# Patient Record
Sex: FEMALE | Race: ASIAN | NOT HISPANIC OR LATINO | URBAN - METROPOLITAN AREA
[De-identification: names, ages, dates, MRNs, and addresses within clinical notes are randomized per-mention and may not be internally consistent; named-entity substitution may affect disease eponyms.]

---

## 2017-07-05 ENCOUNTER — EMERGENCY (EMERGENCY)
Facility: HOSPITAL | Age: 27
LOS: 1 days | Discharge: HOME | End: 2017-07-05
Admitting: EMERGENCY MEDICINE
Payer: MEDICAID

## 2017-07-05 VITALS
DIASTOLIC BLOOD PRESSURE: 79 MMHG | OXYGEN SATURATION: 100 % | TEMPERATURE: 98 F | SYSTOLIC BLOOD PRESSURE: 132 MMHG | HEART RATE: 90 BPM | RESPIRATION RATE: 20 BRPM

## 2017-07-05 PROBLEM — Z00.00 ENCOUNTER FOR PREVENTIVE HEALTH EXAMINATION: Status: ACTIVE | Noted: 2017-07-05

## 2017-07-05 PROCEDURE — 99284 EMERGENCY DEPT VISIT MOD MDM: CPT

## 2017-07-05 PROCEDURE — 73140 X-RAY EXAM OF FINGER(S): CPT | Mod: 26,RT

## 2017-07-05 RX ORDER — TETANUS TOXOID, REDUCED DIPHTHERIA TOXOID AND ACELLULAR PERTUSSIS VACCINE, ADSORBED 5; 2.5; 8; 8; 2.5 [IU]/.5ML; [IU]/.5ML; UG/.5ML; UG/.5ML; UG/.5ML
0.5 SUSPENSION INTRAMUSCULAR ONCE
Qty: 0 | Refills: 0 | Status: COMPLETED | OUTPATIENT
Start: 2017-07-05 | End: 2017-07-05

## 2017-07-05 RX ORDER — IBUPROFEN 200 MG
600 TABLET ORAL ONCE
Qty: 0 | Refills: 0 | Status: COMPLETED | OUTPATIENT
Start: 2017-07-05 | End: 2017-07-05

## 2017-07-05 RX ADMIN — TETANUS TOXOID, REDUCED DIPHTHERIA TOXOID AND ACELLULAR PERTUSSIS VACCINE, ADSORBED 0.5 MILLILITER(S): 5; 2.5; 8; 8; 2.5 SUSPENSION INTRAMUSCULAR at 12:15

## 2017-07-05 RX ADMIN — Medication 1 TABLET(S): at 13:59

## 2017-07-05 RX ADMIN — Medication 600 MILLIGRAM(S): at 12:15

## 2017-07-05 NOTE — ED PROVIDER NOTE - CARE PLAN
Principal Discharge DX:	Finger laceration  Instructions for follow-up, activity and diet:	Rest, drink plenty of fluids  Advance activity as tolerated  Continue all previously prescribed medications as directed  Take Augmentin two times a day for ten days  Call Dr. Alexandre- he will see you in his office today: [phone]   Follow up with your PMD 2-3 days- bring copies of your results  Return to the ER for worsening  Secondary Diagnosis:	Fingernail avulsion  Secondary Diagnosis:	Finger fracture Principal Discharge DX:	Finger laceration  Instructions for follow-up, activity and diet:	Rest, drink plenty of fluids  Advance activity as tolerated  Continue all previously prescribed medications as directed  Take Augmentin two times a day for ten days  Call Dr. Alexandre- he will see you in his office today: [phone]   Follow up with your PMD 2-3 days- bring copies of your results  Return to the ER for worsening  Secondary Diagnosis:	Fingernail avulsion  Secondary Diagnosis:	Finger fracture

## 2017-07-05 NOTE — ED ADULT TRIAGE NOTE - CHIEF COMPLAINT QUOTE
Pt states slammed a door over her right hand pinky finger arrives with bandage over site and dry blood to site.

## 2017-07-05 NOTE — ED PROVIDER NOTE - PHYSICAL EXAMINATION
R 5th finger laceration ~ 2 cm at distal dorsum with avulsion of nail and cuticle bed. Pt unable to full range finger 2/2 pain. Will assess tendon after digital block.

## 2017-07-05 NOTE — ED PROVIDER NOTE - MEDICAL DECISION MAKING DETAILS
R fifth finger laceration and nail avulsion >24 hours ago, PMHx of DM- will give adacel, xray r/o open fx and c/s hand.

## 2017-07-05 NOTE — ED PROVIDER NOTE - PLAN OF CARE
Rest, drink plenty of fluids  Advance activity as tolerated  Continue all previously prescribed medications as directed  Take Augmentin two times a day for ten days  Call Dr. Alexandre- he will see you in his office today: [phone]   Follow up with your PMD 2-3 days- bring copies of your results  Return to the ER for worsening
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
## 2018-07-03 ENCOUNTER — EMERGENCY (EMERGENCY)
Facility: HOSPITAL | Age: 28
LOS: 1 days | Discharge: LEFT BEFORE DISCHARGE | End: 2018-07-03
Attending: EMERGENCY MEDICINE | Admitting: EMERGENCY MEDICINE
Payer: MEDICAID

## 2018-07-03 VITALS
HEART RATE: 89 BPM | RESPIRATION RATE: 16 BRPM | DIASTOLIC BLOOD PRESSURE: 54 MMHG | OXYGEN SATURATION: 100 % | SYSTOLIC BLOOD PRESSURE: 102 MMHG | TEMPERATURE: 99 F

## 2018-07-03 VITALS
OXYGEN SATURATION: 100 % | RESPIRATION RATE: 16 BRPM | SYSTOLIC BLOOD PRESSURE: 100 MMHG | DIASTOLIC BLOOD PRESSURE: 63 MMHG | TEMPERATURE: 98 F | HEART RATE: 92 BPM

## 2018-07-03 LAB
ALBUMIN SERPL ELPH-MCNC: 3.7 G/DL — SIGNIFICANT CHANGE UP (ref 3.3–5)
ALP SERPL-CCNC: 106 U/L — SIGNIFICANT CHANGE UP (ref 40–120)
ALT FLD-CCNC: 23 U/L — SIGNIFICANT CHANGE UP (ref 4–33)
APPEARANCE UR: CLEAR — SIGNIFICANT CHANGE UP
AST SERPL-CCNC: 48 U/L — HIGH (ref 4–32)
BACTERIA # UR AUTO: SIGNIFICANT CHANGE UP
BASE EXCESS BLDV CALC-SCNC: -2.1 MMOL/L — SIGNIFICANT CHANGE UP
BASE EXCESS BLDV CALC-SCNC: -3.8 MMOL/L — SIGNIFICANT CHANGE UP
BASOPHILS # BLD AUTO: 0.03 K/UL — SIGNIFICANT CHANGE UP (ref 0–0.2)
BASOPHILS NFR BLD AUTO: 0.3 % — SIGNIFICANT CHANGE UP (ref 0–2)
BASOPHILS NFR SPEC: 0 % — SIGNIFICANT CHANGE UP (ref 0–2)
BILIRUB SERPL-MCNC: 0.5 MG/DL — SIGNIFICANT CHANGE UP (ref 0.2–1.2)
BILIRUB UR-MCNC: NEGATIVE — SIGNIFICANT CHANGE UP
BLASTS # FLD: 0 % — SIGNIFICANT CHANGE UP (ref 0–0)
BLOOD GAS VENOUS - CREATININE: 0.38 MG/DL — LOW (ref 0.5–1.3)
BLOOD GAS VENOUS - CREATININE: SIGNIFICANT CHANGE UP MG/DL (ref 0.5–1.3)
BLOOD UR QL VISUAL: HIGH
BUN SERPL-MCNC: 11 MG/DL — SIGNIFICANT CHANGE UP (ref 7–23)
BUN SERPL-MCNC: 12 MG/DL — SIGNIFICANT CHANGE UP (ref 7–23)
CALCIUM SERPL-MCNC: 8.1 MG/DL — LOW (ref 8.4–10.5)
CALCIUM SERPL-MCNC: 8.8 MG/DL — SIGNIFICANT CHANGE UP (ref 8.4–10.5)
CHLORIDE BLDV-SCNC: 103 MMOL/L — SIGNIFICANT CHANGE UP (ref 96–108)
CHLORIDE BLDV-SCNC: 104 MMOL/L — SIGNIFICANT CHANGE UP (ref 96–108)
CHLORIDE SERPL-SCNC: 94 MMOL/L — LOW (ref 98–107)
CHLORIDE SERPL-SCNC: 97 MMOL/L — LOW (ref 98–107)
CO2 SERPL-SCNC: 19 MMOL/L — LOW (ref 22–31)
CO2 SERPL-SCNC: 21 MMOL/L — LOW (ref 22–31)
COLOR SPEC: YELLOW — SIGNIFICANT CHANGE UP
CREAT SERPL-MCNC: 0.39 MG/DL — LOW (ref 0.5–1.3)
CREAT SERPL-MCNC: 0.47 MG/DL — LOW (ref 0.5–1.3)
EOSINOPHIL # BLD AUTO: 0.01 K/UL — SIGNIFICANT CHANGE UP (ref 0–0.5)
EOSINOPHIL NFR BLD AUTO: 0.1 % — SIGNIFICANT CHANGE UP (ref 0–6)
EOSINOPHIL NFR FLD: 0 % — SIGNIFICANT CHANGE UP (ref 0–6)
GAS PNL BLDV: 129 MMOL/L — LOW (ref 136–146)
GAS PNL BLDV: 132 MMOL/L — LOW (ref 136–146)
GLUCOSE BLDV-MCNC: 311 — HIGH (ref 70–99)
GLUCOSE BLDV-MCNC: 338 — HIGH (ref 70–99)
GLUCOSE SERPL-MCNC: 311 MG/DL — HIGH (ref 70–99)
GLUCOSE SERPL-MCNC: 326 MG/DL — HIGH (ref 70–99)
GLUCOSE UR-MCNC: >1000 — SIGNIFICANT CHANGE UP
HCO3 BLDV-SCNC: 22 MMOL/L — SIGNIFICANT CHANGE UP (ref 20–27)
HCO3 BLDV-SCNC: 23 MMOL/L — SIGNIFICANT CHANGE UP (ref 20–27)
HCT VFR BLD CALC: 46.7 % — HIGH (ref 34.5–45)
HCT VFR BLDV CALC: 44.6 % — SIGNIFICANT CHANGE UP (ref 34.5–45)
HCT VFR BLDV CALC: 44.7 % — SIGNIFICANT CHANGE UP (ref 34.5–45)
HGB BLD-MCNC: 14.8 G/DL — SIGNIFICANT CHANGE UP (ref 11.5–15.5)
HGB BLDV-MCNC: 14.5 G/DL — SIGNIFICANT CHANGE UP (ref 11.5–15.5)
HGB BLDV-MCNC: 14.6 G/DL — SIGNIFICANT CHANGE UP (ref 11.5–15.5)
IMM GRANULOCYTES # BLD AUTO: 0.05 # — SIGNIFICANT CHANGE UP
IMM GRANULOCYTES NFR BLD AUTO: 0.4 % — SIGNIFICANT CHANGE UP (ref 0–1.5)
KETONES UR-MCNC: SIGNIFICANT CHANGE UP
LACTATE BLDV-MCNC: 2.1 MMOL/L — HIGH (ref 0.5–2)
LACTATE BLDV-MCNC: 3.2 MMOL/L — HIGH (ref 0.5–2)
LEUKOCYTE ESTERASE UR-ACNC: NEGATIVE — SIGNIFICANT CHANGE UP
LIDOCAIN IGE QN: 37.6 U/L — SIGNIFICANT CHANGE UP (ref 7–60)
LYMPHOCYTES # BLD AUTO: 1.02 K/UL — SIGNIFICANT CHANGE UP (ref 1–3.3)
LYMPHOCYTES # BLD AUTO: 9.2 % — LOW (ref 13–44)
LYMPHOCYTES NFR SPEC AUTO: 6 % — LOW (ref 13–44)
MCHC RBC-ENTMCNC: 24.3 PG — LOW (ref 27–34)
MCHC RBC-ENTMCNC: 31.7 % — LOW (ref 32–36)
MCV RBC AUTO: 76.6 FL — LOW (ref 80–100)
METAMYELOCYTES # FLD: 0 % — SIGNIFICANT CHANGE UP (ref 0–1)
MICROCYTES BLD QL: SLIGHT — SIGNIFICANT CHANGE UP
MONOCYTES # BLD AUTO: 0.69 K/UL — SIGNIFICANT CHANGE UP (ref 0–0.9)
MONOCYTES NFR BLD AUTO: 6.2 % — SIGNIFICANT CHANGE UP (ref 2–14)
MONOCYTES NFR BLD: 5.2 % — SIGNIFICANT CHANGE UP (ref 2–9)
MUCOUS THREADS # UR AUTO: SIGNIFICANT CHANGE UP
MYELOCYTES NFR BLD: 0 % — SIGNIFICANT CHANGE UP (ref 0–0)
NEUTROPHIL AB SER-ACNC: 80.2 % — HIGH (ref 43–77)
NEUTROPHILS # BLD AUTO: 9.33 K/UL — HIGH (ref 1.8–7.4)
NEUTROPHILS NFR BLD AUTO: 83.8 % — HIGH (ref 43–77)
NEUTS BAND # BLD: 6 % — SIGNIFICANT CHANGE UP (ref 0–6)
NITRITE UR-MCNC: NEGATIVE — SIGNIFICANT CHANGE UP
NRBC # FLD: 0 — SIGNIFICANT CHANGE UP
OTHER - HEMATOLOGY %: 0 — SIGNIFICANT CHANGE UP
PCO2 BLDV: 32 MMHG — LOW (ref 41–51)
PCO2 BLDV: 36 MMHG — LOW (ref 41–51)
PH BLDV: 7.4 PH — SIGNIFICANT CHANGE UP (ref 7.32–7.43)
PH BLDV: 7.42 PH — SIGNIFICANT CHANGE UP (ref 7.32–7.43)
PH UR: 6 — SIGNIFICANT CHANGE UP (ref 4.6–8)
PLATELET # BLD AUTO: 81 K/UL — LOW (ref 150–400)
PLATELET COUNT - ESTIMATE: SIGNIFICANT CHANGE UP
PMV BLD: 10.2 FL — SIGNIFICANT CHANGE UP (ref 7–13)
PO2 BLDV: 136 MMHG — HIGH (ref 35–40)
PO2 BLDV: 54 MMHG — HIGH (ref 35–40)
POTASSIUM BLDV-SCNC: 4.9 MMOL/L — HIGH (ref 3.4–4.5)
POTASSIUM BLDV-SCNC: 4.9 MMOL/L — HIGH (ref 3.4–4.5)
POTASSIUM SERPL-MCNC: 5.3 MMOL/L — SIGNIFICANT CHANGE UP (ref 3.5–5.3)
POTASSIUM SERPL-MCNC: 5.5 MMOL/L — HIGH (ref 3.5–5.3)
POTASSIUM SERPL-SCNC: 5.3 MMOL/L — SIGNIFICANT CHANGE UP (ref 3.5–5.3)
POTASSIUM SERPL-SCNC: 5.5 MMOL/L — HIGH (ref 3.5–5.3)
PROMYELOCYTES # FLD: 0 % — SIGNIFICANT CHANGE UP (ref 0–0)
PROT SERPL-MCNC: 7.6 G/DL — SIGNIFICANT CHANGE UP (ref 6–8.3)
PROT UR-MCNC: 20 MG/DL — SIGNIFICANT CHANGE UP
RBC # BLD: 6.1 M/UL — HIGH (ref 3.8–5.2)
RBC # FLD: 14.2 % — SIGNIFICANT CHANGE UP (ref 10.3–14.5)
RBC CASTS # UR COMP ASSIST: SIGNIFICANT CHANGE UP (ref 0–?)
SAO2 % BLDV: 86.2 % — HIGH (ref 60–85)
SAO2 % BLDV: 98.3 % — HIGH (ref 60–85)
SODIUM SERPL-SCNC: 131 MMOL/L — LOW (ref 135–145)
SODIUM SERPL-SCNC: 132 MMOL/L — LOW (ref 135–145)
SP GR SPEC: 1.04 — SIGNIFICANT CHANGE UP (ref 1–1.04)
SQUAMOUS # UR AUTO: SIGNIFICANT CHANGE UP
UROBILINOGEN FLD QL: NORMAL MG/DL — SIGNIFICANT CHANGE UP
VARIANT LYMPHS # BLD: 2.6 % — SIGNIFICANT CHANGE UP
WBC # BLD: 11.13 K/UL — HIGH (ref 3.8–10.5)
WBC # FLD AUTO: 11.13 K/UL — HIGH (ref 3.8–10.5)
WBC UR QL: SIGNIFICANT CHANGE UP (ref 0–?)

## 2018-07-03 PROCEDURE — 74177 CT ABD & PELVIS W/CONTRAST: CPT | Mod: 26

## 2018-07-03 PROCEDURE — 99284 EMERGENCY DEPT VISIT MOD MDM: CPT

## 2018-07-03 RX ORDER — SODIUM CHLORIDE 9 MG/ML
1000 INJECTION INTRAMUSCULAR; INTRAVENOUS; SUBCUTANEOUS ONCE
Qty: 0 | Refills: 0 | Status: DISCONTINUED | OUTPATIENT
Start: 2018-07-03 | End: 2018-07-03

## 2018-07-03 RX ORDER — FAMOTIDINE 10 MG/ML
20 INJECTION INTRAVENOUS ONCE
Qty: 0 | Refills: 0 | Status: COMPLETED | OUTPATIENT
Start: 2018-07-03 | End: 2018-07-03

## 2018-07-03 RX ORDER — ACETAMINOPHEN 500 MG
1000 TABLET ORAL ONCE
Qty: 0 | Refills: 0 | Status: COMPLETED | OUTPATIENT
Start: 2018-07-03 | End: 2018-07-03

## 2018-07-03 RX ORDER — SODIUM CHLORIDE 9 MG/ML
1000 INJECTION INTRAMUSCULAR; INTRAVENOUS; SUBCUTANEOUS ONCE
Qty: 0 | Refills: 0 | Status: COMPLETED | OUTPATIENT
Start: 2018-07-03 | End: 2018-07-03

## 2018-07-03 RX ORDER — SODIUM CHLORIDE 9 MG/ML
1000 INJECTION, SOLUTION INTRAVENOUS
Qty: 0 | Refills: 0 | Status: DISCONTINUED | OUTPATIENT
Start: 2018-07-03 | End: 2018-07-07

## 2018-07-03 RX ORDER — LIDOCAINE 4 G/100G
15 CREAM TOPICAL ONCE
Qty: 0 | Refills: 0 | Status: COMPLETED | OUTPATIENT
Start: 2018-07-03 | End: 2018-07-03

## 2018-07-03 RX ADMIN — FAMOTIDINE 20 MILLIGRAM(S): 10 INJECTION INTRAVENOUS at 17:48

## 2018-07-03 RX ADMIN — Medication 30 MILLILITER(S): at 17:48

## 2018-07-03 RX ADMIN — LIDOCAINE 15 MILLILITER(S): 4 CREAM TOPICAL at 17:48

## 2018-07-03 RX ADMIN — Medication 400 MILLIGRAM(S): at 18:03

## 2018-07-03 RX ADMIN — SODIUM CHLORIDE 1000 MILLILITER(S): 9 INJECTION INTRAMUSCULAR; INTRAVENOUS; SUBCUTANEOUS at 17:40

## 2018-07-03 RX ADMIN — Medication 20 MILLIGRAM(S): at 18:06

## 2018-07-03 NOTE — ED PROVIDER NOTE - CARE PLAN
Principal Discharge DX:	Abdominal pain  Assessment and plan of treatment:	You are leaving the hospital against medical advice  Follow up with your primary care provider within 48 hours  Return to the emergency department with any worsening or concerning symptoms, increased abdominal pain, weakness, nausea, vomiting, fever or any other concerns.

## 2018-07-03 NOTE — ED ADULT NURSE NOTE - OBJECTIVE STATEMENT
Receive pt in intake alert and oriented x 3 c/o epigastric pain,  and diarrhea.  Denies dizziness, chills, nausea, recent travel or sick contact.  IV placed.  Labs sent.  VS see charting.  Meds given.  NS in progress

## 2018-07-03 NOTE — ED PROVIDER NOTE - PLAN OF CARE
You are leaving the hospital against medical advice  Follow up with your primary care provider within 48 hours  Return to the emergency department with any worsening or concerning symptoms, increased abdominal pain, weakness, nausea, vomiting, fever or any other concerns.

## 2018-07-03 NOTE — ED PROVIDER NOTE - ATTENDING CONTRIBUTION TO CARE
I performed a face to face bedside interview with patient regarding history of present illness, review of symptoms and past medical history. I completed an independent physical exam.  I have discussed patient's plan of care.   I agree with note as stated above, having amended the EMR as needed to reflect my findings. I have discussed the assessment and plan of care.  This includes during the time I functioned as the attending physician for this patient.  Attending Contribution to Care: agree with plan of resident. pt p/w one day of diarrhea, n/v, abdominal pain that began this morning. labs with mild hyponatremia. pt with no dka. toelrating po. pt ama out of ed secondary to request to f/u with pmd albeit elevated glc. pt understands risks and will f/u. no slurred speech. no unsteady gait.   Pt is alert and oriented. The pt understands the illness, suggessted treatment and risks and benefits. They understand the risk including permenant disability, or death. The pt is acting rationally and does not want our suggested treatment. They were instructed to f/u with their pmd immediately and instructed to return with any concerns.

## 2018-07-03 NOTE — ED SUB INTERN NOTE - OBJECTIVE STATEMENT FT
The patient notes that this episode began this morning w/ multiple episodes of watery diarrhea. She then developed intermittent, sharp, midline, upper abdominal pain that does not radiate. She had 6 episodes of NBNB emesis and associated decrease in PO intake. Last FSG yesterday was 101. Denies fever, chills, chest pain, SOB, hematochezia, melena, hematemesis, urinary symptoms. LMP was 6/4 and pt has irregular periods.

## 2018-07-03 NOTE — ED SUB INTERN NOTE - GASTROINTESTINAL, MLM
Abdomen soft, ND, TTP in epigastric region and suprapubic region, no rebound, no guarding. +BS. No CVA tenderness noted.

## 2018-07-03 NOTE — ED SUB INTERN NOTE - MEDICAL DECISION MAKING DETAILS
26 y/o F w/ PMH of DM p/w abdominal pain, emesis x6, and diarrhea. PE significant for epigastric and suprapubic TTP.  - will get CBC, CMP, lipase, UA, HCG, FSG, VBG  - will give IVF, GI cocktail, and reassess

## 2018-07-03 NOTE — ED PROVIDER NOTE - OBJECTIVE STATEMENT
28 yo female with PMH of DM on metformin, presents to the ED for one day of diarrhea, n/v, abdominal pain that began this morning. Pt felt well throughout the day yesterday, woke up and had multiple episodes of watery diarrhea. She then developed intermittent, sharp, midline, upper abdominal pain that does not radiate. She had 6 episodes of NBNB emesis and associated decrease in PO intake. Last FSG yesterday was 101. Denies fever, chills, chest pain, SOB, hematochezia, melena, hematemesis, urinary symptoms. LMP was 6/4 and pt has irregular periods.

## 2018-07-03 NOTE — ED ADULT TRIAGE NOTE - CHIEF COMPLAINT QUOTE
pt amb to triage c/o N/V/D associated w/ epigastric pain since this morning, last able to tolerate PO last night, LMP 6/4, denies sick contacts

## 2018-07-03 NOTE — ED PROVIDER NOTE - PROGRESS NOTE DETAILS
Pt having intermittent epigastric pain. Will order IV tylenol and bentyl, reeval; if lipase elevated, will obtain lipid panel and RUQ US NEIL More: Pt signed out to me by : 27yF w/pmhx DM presented with n/v/d, abdominal pain, found to have elevated glucose, CT w/ subaceous cyst no internal abdominal pathology as cause of pain. Pt evaluated with , she is now feeling much better. Plan is to repeat labs and likely d/c home with PMD follow up. NEIL More: Discussed with pt that her glucose is still elevated at 311, lactate elevated at 2.1, anion gap is now 15 from 17. Recommended another liter of fluids with repeat labs. Pt is requesting to leave the hospital and follow up with her primary care provider states she feels well and does not want more fluids. Discussed the risks of leaving witht he pt and she continues to request to leave. Franco pt sign out AMA. The pt is clinically sober, AA&Ox3, free from distracting injury.  Throughout our interactions in the ED today, the pt has demonstrated concrete thinking/reasoning, has maintained an orderly/reasonable conversation, appears to have intact insight/judgment/reason and therefore in our opinion has capacity to make decisions.  Given the pt’s presentation, we communicated our concern for hyperglycemia and electrolyte abnormalities.    The pt verbalized an understanding of our worries. We’ve told the patient that the ED evaluation is incomplete & many troublesome conditions haven’t been r/o.   We have discussed the need for further ED w/u so we can get more information about her blood sugar control.  We have discussed the range of possible dx, potential testing & tx options.  We’ve made  numerous efforts to prevent the pt from leaving AMA.  Our discussions included the potential outcomes of leaving AMA, including worsening of their condition, becoming permanently disabled/in pain/critically ill, or death.  Despite these efforts, we were unable to convince the pt to stay.  The pt is refusing any  further care and is leaving against medical advice. We have attempted to offer tx/rx/guidance for any dangerous conditions which are most likely and/or dangerous.  We have answered all questions and have implored the pt to return ASAP to complete the w/u.  A staff member witnessed the patient consenting to AMA.

## 2018-07-04 LAB — SPECIMEN SOURCE: SIGNIFICANT CHANGE UP

## 2018-07-05 LAB — BACTERIA UR CULT: SIGNIFICANT CHANGE UP

## 2018-07-05 NOTE — ED POST DISCHARGE NOTE - RESULT SUMMARY
UCX: Culture grew 3 or more types of organisms which indicate collection contamination; consider recollection only if clinically indicated.

## 2018-07-05 NOTE — ED POST DISCHARGE NOTE - DETAILS
[phone] - Discussed UCX results with patient.  Pt denies fever, chills, dysuria, hematuria, frequency or abdominal pain.  Discussed with pt to follow up with PMD for repeat UA/UCX.

## 2019-06-18 NOTE — ED ADULT TRIAGE NOTE - BP NONINVASIVE DIASTOLIC (MM HG)
Referral made for diminished hearing at patient's request.
Romaine Srinivasan wants an order for an hearing eval for diminished hearing faxed to [facility] for hearing [facility] [phone]
[phone]

## 2020-08-19 ENCOUNTER — EMERGENCY (EMERGENCY)
Age: 30
LOS: 1 days | Discharge: HOME | End: 2020-08-19
Admitting: STUDENT IN AN ORGANIZED HEALTH CARE EDUCATION/TRAINING PROGRAM
Payer: MEDICAID

## 2020-08-19 VITALS
DIASTOLIC BLOOD PRESSURE: 82 MMHG | TEMPERATURE: 97 F | RESPIRATION RATE: 18 BRPM | OXYGEN SATURATION: 97 % | SYSTOLIC BLOOD PRESSURE: 116 MMHG | HEART RATE: 93 BPM

## 2020-08-19 VITALS
OXYGEN SATURATION: 98 % | DIASTOLIC BLOOD PRESSURE: 75 MMHG | SYSTOLIC BLOOD PRESSURE: 110 MMHG | TEMPERATURE: 99 F | HEART RATE: 108 BPM | RESPIRATION RATE: 98 BRPM

## 2020-08-19 PROCEDURE — 99283 EMERGENCY DEPT VISIT LOW MDM: CPT
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
## 2020-08-20 PROBLEM — E11.9 TYPE 2 DIABETES MELLITUS WITHOUT COMPLICATIONS: Chronic | Status: ACTIVE | Noted: 2017-07-05

## 2020-08-20 PROCEDURE — 72100 X-RAY EXAM L-S SPINE 2/3 VWS: CPT | Mod: 26

## 2020-08-20 RX ORDER — IBUPROFEN 200 MG
600 TABLET ORAL ONCE
Refills: 0 | Status: COMPLETED | OUTPATIENT
Start: 2020-08-20 | End: 2020-08-20

## 2020-08-20 RX ADMIN — Medication 600 MILLIGRAM(S): at 00:41

## 2020-08-20 NOTE — ED PROVIDER NOTE - PATIENT PORTAL LINK FT
You can access the FollowMyHealth Patient Portal offered by [facility] by registering at the following website: [email or web address]. By joining [employer]’s FollowMyHealth portal, you will also be able to view your health information using other applications (apps) compatible with our system.
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
## 2020-10-31 ENCOUNTER — EMERGENCY (EMERGENCY)
Facility: HOSPITAL | Age: 30
LOS: 1 days | Discharge: HOME | End: 2020-10-31
Attending: EMERGENCY MEDICINE | Admitting: EMERGENCY MEDICINE
Payer: MEDICAID

## 2020-10-31 VITALS
TEMPERATURE: 99 F | HEART RATE: 102 BPM | RESPIRATION RATE: 18 BRPM | OXYGEN SATURATION: 100 % | DIASTOLIC BLOOD PRESSURE: 85 MMHG | SYSTOLIC BLOOD PRESSURE: 114 MMHG

## 2020-10-31 VITALS
TEMPERATURE: 98 F | SYSTOLIC BLOOD PRESSURE: 129 MMHG | HEART RATE: 104 BPM | DIASTOLIC BLOOD PRESSURE: 92 MMHG | RESPIRATION RATE: 18 BRPM | OXYGEN SATURATION: 100 %

## 2020-10-31 PROBLEM — Z78.9 OTHER SPECIFIED HEALTH STATUS: Chronic | Status: ACTIVE | Noted: 2020-08-20

## 2020-10-31 LAB
ALBUMIN SERPL ELPH-MCNC: 3.4 G/DL — SIGNIFICANT CHANGE UP (ref 3.3–5)
ALP SERPL-CCNC: 103 U/L — SIGNIFICANT CHANGE UP (ref 40–120)
ALT FLD-CCNC: 22 U/L — SIGNIFICANT CHANGE UP (ref 4–33)
ANION GAP SERPL CALC-SCNC: 12 MMO/L — SIGNIFICANT CHANGE UP (ref 7–14)
AST SERPL-CCNC: 23 U/L — SIGNIFICANT CHANGE UP (ref 4–32)
BASOPHILS # BLD AUTO: 0.01 K/UL — SIGNIFICANT CHANGE UP (ref 0–0.2)
BASOPHILS NFR BLD AUTO: 0.2 % — SIGNIFICANT CHANGE UP (ref 0–2)
BILIRUB SERPL-MCNC: 0.3 MG/DL — SIGNIFICANT CHANGE UP (ref 0.2–1.2)
BUN SERPL-MCNC: 11 MG/DL — SIGNIFICANT CHANGE UP (ref 7–23)
CALCIUM SERPL-MCNC: 8.7 MG/DL — SIGNIFICANT CHANGE UP (ref 8.4–10.5)
CHLORIDE SERPL-SCNC: 102 MMOL/L — SIGNIFICANT CHANGE UP (ref 98–107)
CO2 SERPL-SCNC: 20 MMOL/L — LOW (ref 22–31)
CREAT SERPL-MCNC: 0.52 MG/DL — SIGNIFICANT CHANGE UP (ref 0.5–1.3)
EOSINOPHIL # BLD AUTO: 0.01 K/UL — SIGNIFICANT CHANGE UP (ref 0–0.5)
EOSINOPHIL NFR BLD AUTO: 0.2 % — SIGNIFICANT CHANGE UP (ref 0–6)
GLUCOSE SERPL-MCNC: 339 MG/DL — HIGH (ref 70–99)
HCT VFR BLD CALC: 41.2 % — SIGNIFICANT CHANGE UP (ref 34.5–45)
HGB BLD-MCNC: 13.6 G/DL — SIGNIFICANT CHANGE UP (ref 11.5–15.5)
IMM GRANULOCYTES NFR BLD AUTO: 0.6 % — SIGNIFICANT CHANGE UP (ref 0–1.5)
LIDOCAIN IGE QN: 38.7 U/L — SIGNIFICANT CHANGE UP (ref 7–60)
LYMPHOCYTES # BLD AUTO: 1.71 K/UL — SIGNIFICANT CHANGE UP (ref 1–3.3)
LYMPHOCYTES # BLD AUTO: 32.7 % — SIGNIFICANT CHANGE UP (ref 13–44)
MAGNESIUM SERPL-MCNC: 1.7 MG/DL — SIGNIFICANT CHANGE UP (ref 1.6–2.6)
MCHC RBC-ENTMCNC: 26.7 PG — LOW (ref 27–34)
MCHC RBC-ENTMCNC: 33 % — SIGNIFICANT CHANGE UP (ref 32–36)
MCV RBC AUTO: 80.9 FL — SIGNIFICANT CHANGE UP (ref 80–100)
MONOCYTES # BLD AUTO: 0.3 K/UL — SIGNIFICANT CHANGE UP (ref 0–0.9)
MONOCYTES NFR BLD AUTO: 5.7 % — SIGNIFICANT CHANGE UP (ref 2–14)
NEUTROPHILS # BLD AUTO: 3.17 K/UL — SIGNIFICANT CHANGE UP (ref 1.8–7.4)
NEUTROPHILS NFR BLD AUTO: 60.6 % — SIGNIFICANT CHANGE UP (ref 43–77)
NRBC # FLD: 0 K/UL — SIGNIFICANT CHANGE UP (ref 0–0)
PHOSPHATE SERPL-MCNC: 3.3 MG/DL — SIGNIFICANT CHANGE UP (ref 2.5–4.5)
PLATELET # BLD AUTO: 100 K/UL — LOW (ref 150–400)
PMV BLD: 9.5 FL — SIGNIFICANT CHANGE UP (ref 7–13)
POTASSIUM SERPL-MCNC: 4.1 MMOL/L — SIGNIFICANT CHANGE UP (ref 3.5–5.3)
POTASSIUM SERPL-SCNC: 4.1 MMOL/L — SIGNIFICANT CHANGE UP (ref 3.5–5.3)
PROT SERPL-MCNC: 7.1 G/DL — SIGNIFICANT CHANGE UP (ref 6–8.3)
RBC # BLD: 5.09 M/UL — SIGNIFICANT CHANGE UP (ref 3.8–5.2)
RBC # FLD: 12 % — SIGNIFICANT CHANGE UP (ref 10.3–14.5)
SARS-COV-2 RNA SPEC QL NAA+PROBE: DETECTED
SODIUM SERPL-SCNC: 134 MMOL/L — LOW (ref 135–145)
WBC # BLD: 5.23 K/UL — SIGNIFICANT CHANGE UP (ref 3.8–10.5)
WBC # FLD AUTO: 5.23 K/UL — SIGNIFICANT CHANGE UP (ref 3.8–10.5)

## 2020-10-31 PROCEDURE — 99284 EMERGENCY DEPT VISIT MOD MDM: CPT

## 2020-10-31 PROCEDURE — 76705 ECHO EXAM OF ABDOMEN: CPT | Mod: 26

## 2020-10-31 RX ORDER — IBUPROFEN 200 MG
600 TABLET ORAL ONCE
Refills: 0 | Status: COMPLETED | OUTPATIENT
Start: 2020-10-31 | End: 2020-10-31

## 2020-10-31 RX ORDER — IBUPROFEN 200 MG
600 TABLET ORAL ONCE
Refills: 0 | Status: DISCONTINUED | OUTPATIENT
Start: 2020-10-31 | End: 2020-10-31

## 2020-10-31 RX ORDER — IBUPROFEN 200 MG
1 TABLET ORAL
Qty: 28 | Refills: 0
Start: 2020-10-31 | End: 2020-11-06

## 2020-10-31 RX ADMIN — Medication 600 MILLIGRAM(S): at 17:27

## 2020-10-31 NOTE — ED PROVIDER NOTE - CLINICAL SUMMARY MEDICAL DECISION MAKING FREE TEXT BOX
29F here for 1 week history of myalgias, malaise and epigastric pain. Concern for covid vs flu vs viral illness and cholelithiasis/cystitis. Low concern for ACS vs PNA. Will order cbc, cmp, covid, RUQ scan and reassess. Ibuprofen for pain. Traci att: 29 F here for 1 week history of myalgias, malaise and epigastric pain. Concern for covid vs flu vs viral illness and cholelithiasis/cystitis. Low concern for ACS vs PNA. Will order cbc, cmp, covid, RUQ scan and reassess. Ibuprofen for pain.

## 2020-10-31 NOTE — ED PROVIDER NOTE - PATIENT PORTAL LINK FT
You can access the FollowMyHealth Patient Portal offered by [facility] by registering at the following website: [email or web address]. By joining [employer]’s FollowMyHealth portal, you will also be able to view your health information using other applications (apps) compatible with our system.

## 2020-10-31 NOTE — ED PROVIDER NOTE - NSFOLLOWUPINSTRUCTIONS_ED_ALL_ED_FT
You have a viral syndrome. This can last from 5-10 days.     Alternate Tylenol and Motrin every 4-6 hours for fever control as well as body aches and chills. Drink plenty of fluids. Rest. Return to the emergency room for worsening condition or new concerning symptoms. Follow up with your regular doctor. If you don't have a regular doctor use one of the numbers below to establish a primary care doctor.    A viral infection can be caused by different types of viruses. Most viral infections are not serious and resolve on their own. However, some infections may cause severe symptoms and may lead to further complications.    SYMPTOMS  Viruses can frequently cause:  Minor sore throat. Aches and pains. Headaches. Runny nose. Different types of rashes. Watery eyes. Tiredness. Cough. Loss of appetite. Gastrointestinal infections, resulting in nausea, vomiting, and diarrhea.     These symptoms do not respond to antibiotics because the infection is not caused by bacteria. However, you might catch a bacterial infection following the viral infection. This is sometimes called a "superinfection."   Symptoms of such a bacterial infection may include:  Worsening sore throat with pus and difficulty swallowing. Swollen neck glands. Chills and a high or persistent fever. Severe headache. Tenderness over the sinuses. Persistent overall ill feeling (malaise), muscle aches, and tiredness (fatigue). Persistent cough. Yellow, green, or brown mucus production with coughing.     HOME CARE INSTRUCTIONS   Only take over-the-counter or prescription medicines for pain, discomfort, diarrhea, or fever as directed by your caregiver.   Drink enough water and fluids to keep your urine clear or pale yellow. Sports drinks can provide valuable electrolytes, sugars, and hydration. Get plenty of rest and maintain proper nutrition.   Soups and broths with crackers or rice are fine.     SEEK IMMEDIATE MEDICAL CARE IF:   You have severe headaches, shortness of breath, chest pain, neck pain, or an unusual rash. You have uncontrolled vomiting, diarrhea, or you are unable to keep down fluids.

## 2020-10-31 NOTE — ED ADULT NURSE NOTE - OBJECTIVE STATEMENT
received pt in bed A and OX 3 in NAD resting comfortably, c/o generalized body aches, and malaise, denies fever, chills,  diarrhea, constipation,  pt endorses nausea, denies vomiting. abd soft non distended BS present 4 quadrants, orders noted and completed.

## 2020-10-31 NOTE — ED ADULT NURSE REASSESSMENT NOTE - NS ED NURSE REASSESS COMMENT FT1
Pt resting comfortably and in no acute distress at this time. Pt currently awaiting COVID results. Will continue to monitor.

## 2020-10-31 NOTE — ED PROVIDER NOTE - PHYSICAL EXAMINATION
Subjective:       Patient ID: Ralph Hughes is a 31 y.o. female.    Chief Complaint:  No chief complaint on file.      History of Present Illness  HPI  {OBG HPI BLOCKS:20776}    GYN & OB History  No LMP recorded.   Date of Last Pap: 2014    OB History    Para Term  AB SAB TAB Ectopic Multiple Living   3 2 1 1      2      # Outcome Date GA Lbr Vipin/2nd Weight Sex Delivery Anes PTL Lv   3             2  06/15/11 34w0d  2.155 kg (4 lb 12 oz) F CS-LTranv EPI Y Y   1 Term 09 40w0d  3.459 kg (7 lb 10 oz) F CS-LTranv EPI N Y      Complications: Fetal Intolerance          Review of Systems  Review of Systems        Objective:    Physical Exam       Assessment:      No diagnosis found.         Plan:      There are no diagnoses linked to this encounter.  No Follow-up on file.         Gen: WDWN, NAD  HEENT: EOMI, no nasal discharge, mucous membranes moist  CV: RRR, +S1/S2, no M/R/G  Resp: CTAB, no W/R/R  GI: Abdomen soft non-distended, TTP in the epigastrium, no masses  MSK: No open wounds, no bruising, no LE edema  Neuro: A&Ox4, following commands, moving all four extremities spontaneously  Psych: appropriate mood, denies AH, VH, SI

## 2020-10-31 NOTE — ED PROVIDER NOTE - OBJECTIVE STATEMENT
29F   feeling sick since Sunday, complaining of headache, body aches, nausea with eating, abdominal pain, no fevers, no sick contacts, no recent travel, no recent covid test, no cough, no senseof smell and taste all week   Non-responsive to nyquil and aleve (last time last night)

## 2021-08-03 NOTE — ED PROVIDER NOTE - SKIN LOCATION #1
Initiate Treatment: Triamcinalone cream twice daily for two weeks, then discontinue
Detail Level: Zone
Render In Strict Bullet Format?: No
finger

## 2021-12-13 NOTE — ED PROVIDER NOTE - ATTENDING CONTRIBUTION TO CARE
35 mL of dark red fluid noted in bakri chamber. Dr bazan notified, no new orders rec'd.    I was physically present for the E/M service provided. I agree with above history, physical, and plan which I have reviewed and edited where appropriate. I was physically present for the key portions of the service provided.

## 2024-09-14 ENCOUNTER — EMERGENCY (EMERGENCY)
Facility: HOSPITAL | Age: 34
LOS: 1 days | Discharge: HOME | End: 2024-09-14
Attending: EMERGENCY MEDICINE | Admitting: EMERGENCY MEDICINE
Payer: MEDICAID

## 2024-09-14 VITALS
RESPIRATION RATE: 19 BRPM | OXYGEN SATURATION: 99 % | HEART RATE: 80 BPM | TEMPERATURE: 99 F | DIASTOLIC BLOOD PRESSURE: 60 MMHG | SYSTOLIC BLOOD PRESSURE: 124 MMHG

## 2024-09-14 VITALS — WEIGHT: 154.1 LBS | HEIGHT: 65 IN

## 2024-09-14 LAB
ALBUMIN SERPL ELPH-MCNC: 3 G/DL — LOW (ref 3.3–5)
ALP SERPL-CCNC: 103 U/L — SIGNIFICANT CHANGE UP (ref 40–120)
ALT FLD-CCNC: 15 U/L — SIGNIFICANT CHANGE UP (ref 4–33)
ANION GAP SERPL CALC-SCNC: 10 MMOL/L — SIGNIFICANT CHANGE UP (ref 7–14)
APPEARANCE UR: ABNORMAL
AST SERPL-CCNC: 13 U/L — SIGNIFICANT CHANGE UP (ref 4–32)
BACTERIA # UR AUTO: ABNORMAL /HPF
BASOPHILS # BLD AUTO: 0.03 K/UL — SIGNIFICANT CHANGE UP (ref 0–0.2)
BASOPHILS NFR BLD AUTO: 0.5 % — SIGNIFICANT CHANGE UP (ref 0–2)
BILIRUB SERPL-MCNC: 0.3 MG/DL — SIGNIFICANT CHANGE UP (ref 0.2–1.2)
BILIRUB UR-MCNC: NEGATIVE — SIGNIFICANT CHANGE UP
BUN SERPL-MCNC: 10 MG/DL — SIGNIFICANT CHANGE UP (ref 7–23)
CALCIUM SERPL-MCNC: 8.3 MG/DL — LOW (ref 8.4–10.5)
CHLORIDE SERPL-SCNC: 105 MMOL/L — SIGNIFICANT CHANGE UP (ref 98–107)
CO2 SERPL-SCNC: 22 MMOL/L — SIGNIFICANT CHANGE UP (ref 22–31)
COLOR SPEC: YELLOW — SIGNIFICANT CHANGE UP
CREAT SERPL-MCNC: 0.58 MG/DL — SIGNIFICANT CHANGE UP (ref 0.5–1.3)
DIFF PNL FLD: ABNORMAL
EGFR: 124 ML/MIN/1.73M2 — SIGNIFICANT CHANGE UP
EOSINOPHIL # BLD AUTO: 0.08 K/UL — SIGNIFICANT CHANGE UP (ref 0–0.5)
EOSINOPHIL NFR BLD AUTO: 1.3 % — SIGNIFICANT CHANGE UP (ref 0–6)
EPI CELLS # UR: PRESENT
GLUCOSE SERPL-MCNC: 272 MG/DL — HIGH (ref 70–99)
GLUCOSE UR QL: >=1000 MG/DL
HCG SERPL-ACNC: 1922 MIU/ML — SIGNIFICANT CHANGE UP
HCT VFR BLD CALC: 39.3 % — SIGNIFICANT CHANGE UP (ref 34.5–45)
HGB BLD-MCNC: 13.3 G/DL — SIGNIFICANT CHANGE UP (ref 11.5–15.5)
HYALINE CASTS # UR AUTO: PRESENT
IANC: 3.49 K/UL — SIGNIFICANT CHANGE UP (ref 1.8–7.4)
IMM GRANULOCYTES NFR BLD AUTO: 0.5 % — SIGNIFICANT CHANGE UP (ref 0–0.9)
KETONES UR-MCNC: NEGATIVE MG/DL — SIGNIFICANT CHANGE UP
LEUKOCYTE ESTERASE UR-ACNC: NEGATIVE — SIGNIFICANT CHANGE UP
LIDOCAIN IGE QN: 53 U/L — SIGNIFICANT CHANGE UP (ref 7–60)
LYMPHOCYTES # BLD AUTO: 2.29 K/UL — SIGNIFICANT CHANGE UP (ref 1–3.3)
LYMPHOCYTES # BLD AUTO: 36.2 % — SIGNIFICANT CHANGE UP (ref 13–44)
MCHC RBC-ENTMCNC: 28.7 PG — SIGNIFICANT CHANGE UP (ref 27–34)
MCHC RBC-ENTMCNC: 33.8 GM/DL — SIGNIFICANT CHANGE UP (ref 32–36)
MCV RBC AUTO: 84.9 FL — SIGNIFICANT CHANGE UP (ref 80–100)
MONOCYTES # BLD AUTO: 0.4 K/UL — SIGNIFICANT CHANGE UP (ref 0–0.9)
MONOCYTES NFR BLD AUTO: 6.3 % — SIGNIFICANT CHANGE UP (ref 2–14)
NEUTROPHILS # BLD AUTO: 3.49 K/UL — SIGNIFICANT CHANGE UP (ref 1.8–7.4)
NEUTROPHILS NFR BLD AUTO: 55.2 % — SIGNIFICANT CHANGE UP (ref 43–77)
NITRITE UR-MCNC: NEGATIVE — SIGNIFICANT CHANGE UP
NRBC # BLD: 0 /100 WBCS — SIGNIFICANT CHANGE UP (ref 0–0)
NRBC # FLD: 0 K/UL — SIGNIFICANT CHANGE UP (ref 0–0)
PH UR: 6.5 — SIGNIFICANT CHANGE UP (ref 5–8)
PLATELET # BLD AUTO: 186 K/UL — SIGNIFICANT CHANGE UP (ref 150–400)
POTASSIUM SERPL-MCNC: 4.1 MMOL/L — SIGNIFICANT CHANGE UP (ref 3.5–5.3)
POTASSIUM SERPL-SCNC: 4.1 MMOL/L — SIGNIFICANT CHANGE UP (ref 3.5–5.3)
PROT SERPL-MCNC: 6.2 G/DL — SIGNIFICANT CHANGE UP (ref 6–8.3)
PROT UR-MCNC: >=1000 MG/DL
RBC # BLD: 4.63 M/UL — SIGNIFICANT CHANGE UP (ref 3.8–5.2)
RBC # FLD: 12.3 % — SIGNIFICANT CHANGE UP (ref 10.3–14.5)
RBC CASTS # UR COMP ASSIST: 6 /HPF — HIGH (ref 0–4)
SODIUM SERPL-SCNC: 137 MMOL/L — SIGNIFICANT CHANGE UP (ref 135–145)
SP GR SPEC: 1.04 — HIGH (ref 1–1.03)
UROBILINOGEN FLD QL: 1 MG/DL — SIGNIFICANT CHANGE UP (ref 0.2–1)
WBC # BLD: 6.32 K/UL — SIGNIFICANT CHANGE UP (ref 3.8–10.5)
WBC # FLD AUTO: 6.32 K/UL — SIGNIFICANT CHANGE UP (ref 3.8–10.5)
WBC UR QL: 2 /HPF — SIGNIFICANT CHANGE UP (ref 0–5)

## 2024-09-14 PROCEDURE — 76705 ECHO EXAM OF ABDOMEN: CPT | Mod: 26

## 2024-09-14 PROCEDURE — 99284 EMERGENCY DEPT VISIT MOD MDM: CPT

## 2024-09-14 RX ORDER — KETOROLAC TROMETHAMINE 30 MG/ML
15 INJECTION, SOLUTION INTRAMUSCULAR ONCE
Refills: 0 | Status: COMPLETED | OUTPATIENT
Start: 2024-09-14 | End: 2024-09-14

## 2024-09-14 RX ORDER — KETOROLAC TROMETHAMINE 30 MG/ML
15 INJECTION, SOLUTION INTRAMUSCULAR ONCE
Refills: 0 | Status: DISCONTINUED | OUTPATIENT
Start: 2024-09-14 | End: 2024-09-14

## 2024-09-14 RX ADMIN — KETOROLAC TROMETHAMINE 15 MILLIGRAM(S): 30 INJECTION, SOLUTION INTRAMUSCULAR at 15:57

## 2024-09-14 NOTE — ED PROVIDER NOTE - PHYSICAL EXAMINATION
VITAL SIGNS: I have reviewed nursing notes and confirm.  CONSTITUTIONAL:  in no acute distress.  SKIN: Skin exam is warm and dry, no acute rash.  HEAD: Normocephalic; atraumatic.  CARD: Regular rate and rhythm.  RESP: No wheezes,  no rales or rhonchi.   ABD:  RUQ tenderness w/o rebound or guarding. Abd soft, non-distended.   EXT: Normal ROM. No clubbing, cyanosis or edema.

## 2024-09-14 NOTE — ED PROVIDER NOTE - CLINICAL SUMMARY MEDICAL DECISION MAKING FREE TEXT BOX
Patient is a 33y  9wk pregnant female w/ PMHx of non-insulin dependent T2DM who presents to ED w/ c/o abdominal pain. Patient stated on Tuesday she began to experience right sided abd pain that progressed to sharp cramping & light red vaginal bleeding yesterday that did not require use of pads. Per patient, LMP 24 & had two TVUS that confirmed presence of yolk sac but no fetal heartbeat. Patient stated she is scheduled for a D+C on  but cannot wait that long. Endorses fever, chills. Denies headache, chest pain, n/v/d, dysuria, hematuria, abnormal vaginal discharge.  PE remarkable for RUQ tenderness w/o rebound or guarding. Abd soft, non-distended.  Pelvic exam     Will order CBC, CMP, HCG, Urine reflex, & US to evaluate.

## 2024-09-14 NOTE — ED PROVIDER NOTE - ATTENDING CONTRIBUTION TO CARE
33y  9wk pregnant female w/ PMHx of non-insulin dependent T2DM who presents to ED w/ c/o abdominal pain. Patient stated on Tuesday she began to experience right sided abd pain that progressed to sharp cramping & light red vaginal bleeding yesterday that did not require use of pads. Per patient, LMP 24 & had two TVUS that confirmed presence of yolk sac but no fetal heartbeat. Patient stated she is scheduled for a D+C on  but cannot wait that long. Endorses fever, chills. Denies headache, chest pain, n/v/d, dysuria, hematuria, abnormal vaginal discharge.

## 2024-09-14 NOTE — ED ADULT NURSE REASSESSMENT NOTE - NS ED NURSE REASSESS COMMENT FT1
Results waiting RN: Pt received from intake RN. Resting on stretcher, NAD noted. Pending results. Frequent monitoring in place.

## 2024-09-14 NOTE — ED ADULT NURSE NOTE - PAIN RATING/NUMBER SCALE (0-10): ACTIVITY
8 (severe pain)
Initiate Treatment: Aklief cream QHS to face
Render In Strict Bullet Format?: No
Detail Level: Zone

## 2024-09-14 NOTE — ED PROVIDER NOTE - PATIENT PORTAL LINK FT
You can access the FollowMyHealth Patient Portal offered by [facility] by registering at the following website: [email or web address]. By joining [employer]’s FollowMyHealth portal, you will also be able to view your health information using other applications (apps) compatible with our system.

## 2024-09-14 NOTE — ED ADULT NURSE NOTE - OBJECTIVE STATEMENT
Received patient in Intake 13 c/o right sided abdominal pain, hx of 9 weeks pregnant. PMHX DM. Patient denies SOB, chest pain, fever. Patient is A&OX4, ambulatory, airway patent, breathing unlabored and even, radial pulses palpable. Labs obtained, 22G IV placed on left arm. Side rails up and safety maintained. Call bells within reach. Family at the beside.

## 2024-09-14 NOTE — ED PROVIDER NOTE - OBJECTIVE STATEMENT
Patient is a 33y  9wk pregnant female w/ PMHx of non-insulin dependent T2DM who presents to ED w/ c/o abdominal pain. Patient stated on Tuesday she began to experience right sided abd pain that progressed to sharp cramping & light red vaginal bleeding yesterday that did not require use of pads. Per patient, LMP 24 & had two TVUS that confirmed presence of yolk sac but no fetal heartbeat. Patient stated she is scheduled for a D+C on  but cannot wait that long. Endorses fever, chills. Denies headache, chest pain, n/v/d, dysuria, hematuria, abnormal vaginal discharge.

## 2024-09-14 NOTE — ED PROVIDER NOTE - NSFOLLOWUPINSTRUCTIONS_ED_ALL_ED_FT
You were seen in the emergency department for abdominal pain . We have evaluated you and determined that you do not require further hospital interventions.    Please follow up with your primary care provider as necessary to discuss the results of your stay in our department.    If you start to experience worsening symptoms such as vaginal bleeding, fever, chills , please return to the emergency department for further evaluation.    Abdominal Pain    WHAT YOU NEED TO KNOW:    Abdominal pain can be dull, achy, or sharp. You may have pain in one area of your abdomen, or in your entire abdomen. Your pain may be caused by a condition such as constipation, food sensitivity or poisoning, infection, or a blockage. Abdominal pain can also be from a hernia, appendicitis, or an ulcer. Liver, gallbladder, or kidney conditions can also cause abdominal pain. The cause of your abdominal pain may be unknown.    DISCHARGE INSTRUCTIONS:    Return to the emergency department if:    You have new chest pain or shortness of breath.    You have pulsing pain in your upper abdomen or lower back that suddenly becomes constant.    Your pain is in the right lower abdominal area and worsens with movement.    You have a fever over 100.4°F (38°C) or shaking chills.    You are vomiting and cannot keep food or liquids down.    Your pain does not improve or gets worse over the next 8 to 12 hours.    You see blood in your vomit or bowel movements, or they look black and tarry.    Your skin or the whites of your eyes turn yellow.    You are a woman and have a large amount of vaginal bleeding that is not your monthly period.  Contact your healthcare provider if:    You have pain in your lower back.    You are a man and have pain in your testicles.    You have pain when you urinate.    You have questions or concerns about your condition or care.  Follow up with your healthcare provider within 24 hours or as directed: Write down your questions so you remember to ask them during your visits.    Medicines:    Medicines may be given to calm your stomach and prevent vomiting or to decrease pain. Ask how to take pain medicine safely.    Take your medicine as directed. Contact your healthcare provider if you think your medicine is not helping or if you have side effects. Tell him of her if you are allergic to any medicine. Keep a list of the medicines, vitamins, and herbs you take. Include the amounts, and when and why you take them. Bring the list or the pill bottles to follow-up visits. Carry your medicine list with you in case of an emergency.

## 2024-09-14 NOTE — ED PROVIDER NOTE - PROGRESS NOTE DETAILS
Barb champagne, DO PGY-1  Patient comfortable. US RUQ negative findings. Patient requesting pain control- will order Toradol. Patient stated understanding of management & return precautions. Barb Moses, DO PGY-1  Patient comfortable. US RUQ negative findings. Patient requesting pain control- will order Toradol. Patient declined misoprostol at this time- told by PCP she will have a difficult time tolerating medication.  Patient stated understanding of management & return precautions.

## 2024-09-14 NOTE — ED PROVIDER NOTE - PRINCIPAL DIAGNOSIS
New Patient Please Triage - refer to office note 10/11/2019  Patient was advised to go to primary care provider or STD clinic but he insisted that he had to follow up with urology  Please advise  What is the reason for the patients appointment? STD pt received letter to follow up with urology     Do we accept the patient's insurance or is the patient Self-Pay? [employer]    Has the patient had any previous urologist(s)?no    Has the patients records been requested?no    Has the patient had any outside testing done?no    What is the patients location preference for an office visit? [place]     Does the patient have a personal history of cancer? no
Spoke with mother to advise again to go to clinic but if she wanted we would set up with PA however it would be in patients best interest to go to clinic for treatment    Mother took information and will follow up with clinic [phone]
Abdominal pain

## 2024-09-14 NOTE — ED ADULT NURSE NOTE - CHIEF COMPLAINT QUOTE
positive home pregnancy test 2 weeks ago and was seen by OBGYN clinic. Pt was told there is no heart beat at that time. LMP 24. . Pt scheduled for D+C next week but started c/o right pelvic cramping and spotting. Phx DM2. Fingerstick 275